# Patient Record
Sex: MALE | Race: WHITE | NOT HISPANIC OR LATINO
[De-identification: names, ages, dates, MRNs, and addresses within clinical notes are randomized per-mention and may not be internally consistent; named-entity substitution may affect disease eponyms.]

---

## 2018-06-24 ENCOUNTER — RECORDS - HEALTHEAST (OUTPATIENT)
Dept: ADMINISTRATIVE | Facility: OTHER | Age: 43
End: 2018-06-24

## 2019-07-10 ENCOUNTER — COMMUNICATION - HEALTHEAST (OUTPATIENT)
Dept: FAMILY MEDICINE | Facility: CLINIC | Age: 44
End: 2019-07-10

## 2019-07-11 ENCOUNTER — OFFICE VISIT - HEALTHEAST (OUTPATIENT)
Dept: FAMILY MEDICINE | Facility: CLINIC | Age: 44
End: 2019-07-11

## 2019-07-11 DIAGNOSIS — F41.1 GENERALIZED ANXIETY DISORDER: ICD-10-CM

## 2019-07-11 DIAGNOSIS — G47.00 INSOMNIA, UNSPECIFIED TYPE: ICD-10-CM

## 2019-07-11 ASSESSMENT — MIFFLIN-ST. JEOR: SCORE: 1648.76

## 2019-07-11 NOTE — ASSESSMENT & PLAN NOTE
Previously treated with citalopram which seemed to help but made him sleepy with driving.  Stopped this medicine a couple of years ago  Previously followed with Dr Wilkins    Recommend restarting SSRI, patient wishes to wait, speak with psychologist and consider  Agreeable to see psychologist again for this and insomnia

## 2019-07-11 NOTE — ASSESSMENT & PLAN NOTE
Largely related to generalized anxiety  May be partly due to regular alcohol use    Recommend restarting measures to help treat generalized anxiety disorder  Recommend stopping alcohol intake before bed  Prescribed zolpidem CR 6.25 for short-term use

## 2019-11-05 ENCOUNTER — COMMUNICATION - HEALTHEAST (OUTPATIENT)
Dept: FAMILY MEDICINE | Facility: CLINIC | Age: 44
End: 2019-11-05

## 2019-11-06 ENCOUNTER — AMBULATORY - HEALTHEAST (OUTPATIENT)
Dept: FAMILY MEDICINE | Facility: CLINIC | Age: 44
End: 2019-11-06

## 2019-11-06 DIAGNOSIS — F41.1 GENERALIZED ANXIETY DISORDER: ICD-10-CM

## 2019-11-22 ENCOUNTER — COMMUNICATION - HEALTHEAST (OUTPATIENT)
Dept: SCHEDULING | Facility: CLINIC | Age: 44
End: 2019-11-22

## 2020-08-04 ENCOUNTER — COMMUNICATION - HEALTHEAST (OUTPATIENT)
Dept: FAMILY MEDICINE | Facility: CLINIC | Age: 45
End: 2020-08-04

## 2020-08-04 DIAGNOSIS — F41.1 GENERALIZED ANXIETY DISORDER: ICD-10-CM

## 2021-05-18 ENCOUNTER — COMMUNICATION - HEALTHEAST (OUTPATIENT)
Dept: FAMILY MEDICINE | Facility: CLINIC | Age: 46
End: 2021-05-18

## 2021-05-18 DIAGNOSIS — F41.1 GENERALIZED ANXIETY DISORDER: ICD-10-CM
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
## 2021-05-30 NOTE — TELEPHONE ENCOUNTER
New Appointment Needed  What is the reason for the visit:    New Patient visit - previously saw Dr. Gtz - patient is having difficulties sleeping.  Provider Preference: PCP only  How soon do you need to be seen?: as soon as possible  Waitlist offered?: Yes  Okay to leave a detailed message:  Yes

## 2021-06-03 VITALS — BODY MASS INDEX: 21.67 KG/M2 | WEIGHT: 160 LBS | HEIGHT: 72 IN

## 2021-06-03 NOTE — TELEPHONE ENCOUNTER
Left low back pain for the last two weeks.    Mild soreness    Not interfering with activities    No blood in the urine or stool    No radiating pain anywhere    Recommended that he try some home treatments and if no improvement will schedule an appointment.    Mary Burrell RN   [facility] Medication Refill and Triage Nurse  9:20 AM  11/22/2019      Reason for Disposition    Back pain    Protocols used: BACK PAIN-A-[place]

## 2021-06-03 NOTE — TELEPHONE ENCOUNTER
Please contact him  As he may know, there are number of medications similar to citalopram.  I do not think we tried other ones, can he remind me?  If not, would he be willing to try another one like sertraline/ zoloft?  It is from the same class of medicine.

## 2021-06-03 NOTE — TELEPHONE ENCOUNTER
FYI - Status Update  Who is Calling: James- Pschyologist  Update: Met with patient  today- pt agreed to restart antidepressant - Citalopram, Pt will call MD to intiate medication.     Okay to leave a detailed message?:  No return call needed

## 2021-06-03 NOTE — TELEPHONE ENCOUNTER
Sounds good  Warn him that this med is a little more likely than others to cause diarrhea at first, usually gets better  If it goes well, see me in 1 mo

## 2021-06-03 NOTE — TELEPHONE ENCOUNTER
Patient Returning Call  Reason for call:  lmtcb    Information relayed to patient:  Relayed msg and patient agrees. Patient is willing to try Sertraline as suggested below by PCP . Pharmacy is the   [facility] # [id number]    Patient has additional questions:  No  If YES, what are your questions/concerns:  Na    Okay to leave a detailed message?: Yes

## 2021-06-03 NOTE — TELEPHONE ENCOUNTER
Called and spoke to pt regarding Dr. Gtz message. Pt states he picked up his prescription yesterday and has the instructions.

## 2021-06-09 ENCOUNTER — OFFICE VISIT - HEALTHEAST (OUTPATIENT)
Dept: FAMILY MEDICINE | Facility: CLINIC | Age: 46
End: 2021-06-09

## 2021-06-09 DIAGNOSIS — E78.00 HYPERCHOLESTEROLEMIA: ICD-10-CM

## 2021-06-09 DIAGNOSIS — Z80.8 FAMILY HISTORY OF MALIGNANT MELANOMA: ICD-10-CM

## 2021-06-09 DIAGNOSIS — Z00.00 HEALTHCARE MAINTENANCE: ICD-10-CM

## 2021-06-09 DIAGNOSIS — F41.1 GENERALIZED ANXIETY DISORDER: ICD-10-CM

## 2021-06-09 LAB
ALT SERPL W P-5'-P-CCNC: 35 U/L (ref 0–45)
CHOLEST SERPL-MCNC: 267 MG/DL
FASTING STATUS PATIENT QL REPORTED: YES
FASTING STATUS PATIENT QL REPORTED: YES
GLUCOSE BLD-MCNC: 84 MG/DL (ref 70–99)
HDLC SERPL-MCNC: 63 MG/DL
LDLC SERPL CALC-MCNC: 184 MG/DL
TRIGL SERPL-MCNC: 102 MG/DL

## 2021-06-09 ASSESSMENT — ANXIETY QUESTIONNAIRES
7. FEELING AFRAID AS IF SOMETHING AWFUL MIGHT HAPPEN: NOT AT ALL
4. TROUBLE RELAXING: NOT AT ALL
IF YOU CHECKED OFF ANY PROBLEMS ON THIS QUESTIONNAIRE, HOW DIFFICULT HAVE THESE PROBLEMS MADE IT FOR YOU TO DO YOUR WORK, TAKE CARE OF THINGS AT HOME, OR GET ALONG WITH OTHER PEOPLE: NOT DIFFICULT AT ALL
3. WORRYING TOO MUCH ABOUT DIFFERENT THINGS: NOT AT ALL
1. FEELING NERVOUS, ANXIOUS, OR ON EDGE: SEVERAL DAYS
2. NOT BEING ABLE TO STOP OR CONTROL WORRYING: NOT AT ALL
6. BECOMING EASILY ANNOYED OR IRRITABLE: SEVERAL DAYS
5. BEING SO RESTLESS THAT IT IS HARD TO SIT STILL: NOT AT ALL
GAD7 TOTAL SCORE: 2

## 2021-06-09 ASSESSMENT — PATIENT HEALTH QUESTIONNAIRE - PHQ9: SUM OF ALL RESPONSES TO PHQ QUESTIONS 1-9: 2

## 2021-06-09 ASSESSMENT — MIFFLIN-ST. JEOR: SCORE: 1681.64

## 2021-06-09 NOTE — ASSESSMENT & PLAN NOTE
Had Covid, due for hepatitis a and tetanus which were given today.  Blood pressure good, cholesterol rechecked.  No need for STD test.Exercising regularly.

## 2021-06-09 NOTE — ASSESSMENT & PLAN NOTE
Sertraline has been very effective for him and he reports no side effects.  Following with psychologist.  Will need to change providerwhen he moved to [place].

## 2021-06-10 ENCOUNTER — COMMUNICATION - HEALTHEAST (OUTPATIENT)
Dept: FAMILY MEDICINE | Facility: CLINIC | Age: 46
End: 2021-06-10

## 2021-06-16 PROBLEM — Z80.8 FAMILY HISTORY OF MALIGNANT MELANOMA: Status: ACTIVE | Noted: 2021-06-09

## 2021-06-16 PROBLEM — E78.00 HYPERCHOLESTEROLEMIA: Status: ACTIVE | Noted: 2021-06-09

## 2021-06-16 PROBLEM — G47.00 INSOMNIA, UNSPECIFIED TYPE: Status: ACTIVE | Noted: 2019-07-11

## 2021-06-17 NOTE — TELEPHONE ENCOUNTER
RN cannot approve Refill Request    RN can NOT refill this medication PCP messaged that patient is overdue for Office Visit. Last office visit: 7/11/2019 Fran Gtz MD Last Physical: Visit date not found Last MTM visit: Visit date not found Last visit same specialty: 7/11/2019 Fran Gtz MD.  Next visit within 3 mo: Visit date not found  Next physical within 3 mo: Visit date not found      Kay Melendrez, [facility] Triage/Med Refill 5/19/2021    Requested Prescriptions   Pending Prescriptions Disp Refills     sertraline (ZOLOFT) 50 MG tablet [Pharmacy Med Name: SERTRALINE 50MG TABLETS] 30 tablet 6     Sig: TAKE ONE-HALF TABLET BY MOUTH DAILY FOR 7 DAYS, THEN TAKE 1 TABLET BY MOUTH DAILY       SSRI Refill Protocol  Failed - 5/18/2021  3:00 PM        Failed - PCP or prescribing provider visit in last year     Last office visit with prescriber/PCP: 7/11/2019 Fran Gtz MD OR same dept: Visit date not found OR same specialty: 7/11/2019 Fran Gtz MD  Last physical: Visit date not found Last MTM visit: Visit date not found   Next visit within 3 mo: Visit date not found  Next physical within 3 mo: Visit date not found  Prescriber OR PCP: Fran Gtz MD  Last diagnosis associated with med order: 1. Generalized anxiety disorder  - sertraline (ZOLOFT) 50 MG tablet [Pharmacy Med Name: SERTRALINE 50MG TABLETS]; TAKE ONE-HALF TABLET BY MOUTH DAILY FOR 7 DAYS, THEN TAKE 1 TABLET BY MOUTH DAILY  Dispense: 30 tablet; Refill: 6    If protocol passes may refill for 12 months if within 3 months of last provider visit (or a total of 15 months).

## 2021-06-17 NOTE — TELEPHONE ENCOUNTER
Left message #1 at [phone]. Postponing task out to a week and will try again. If patient returns call back, please help patient schedule an appointment per message below. Thanks!

## 2021-06-26 NOTE — PROGRESS NOTES
Assessment/ Plan  Problem List Items Addressed This Visit        Unprioritized    Generalized anxiety disorder     Sertraline has been very effective for him and he reports no side effects.  Following with psychologist.  Will need to change provider when he moved to [place].         Healthcare maintenance     Had Covid, due for hepatitis a and tetanus which were given today.  Blood pressure good, cholesterol rechecked.  No need for STD test.  Exercising regularly.         Relevant Orders    Glucose    Family history of malignant melanoma - Primary     Father.  Annual body scan.  Will have done with Dr. Graham before he moves.         Relevant Orders    Ambulatory referral to Dermatology - [facility] [place]    Hypercholesterolemia     Elevated screening 2015.  Check fasting lipid today.         Relevant Orders    Lipid [place] FASTING    ALT (SGPT)        Subjective  CC:  Chief Complaint   Patient presents with     Annual Exam     HPI:  46-year-old,  Here for physical exam.  Long-term history of anxiety, on sertraline.  Currently doing very well by his account.  Sertraline gives him no side effects and really seems to help control his anxiety.  Does not think he needs to go up on the dose on this medication.  Following with psychologist.  Wife is moved to [facility], he is going to follow her.  She has a job.  He is going to be quitting his job for now.  PFSH:  Patient Active Problem List   Diagnosis     Generalized anxiety disorder     Healthcare maintenance     Insomnia, unspecified type     Family history of malignant melanoma     Hypercholesterolemia     Current medications reviewed as follows:  Current Outpatient Medications on File Prior to Visit   Medication Sig     sertraline (ZOLOFT) 50 MG tablet TAKE ONE-HALF TABLET BY MOUTH DAILY FOR 7 DAYS, THEN TAKE 1 TABLET BY MOUTH DAILY     [DISCONTINUED] ketoconazole (NIZORAL) 2 % cream Apply to face bid     [DISCONTINUED] zolpidem (AMBIEN CR)  "6.25 MG CR tablet Take 1 tablet (6.25 mg total) by mouth at bedtime as needed for sleep.     No current facility-administered medications on file prior to visit.      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  Gen- alert, oriented/ appropriately responsive  HEENT- normal cephalic, atraumatic.   Chest- Normal inspiration and expiration.    Clear to ascultation.    No chest wall deformity or scar.  CV- Heart regular rate and rhythm  normal tones, no murmurs   No gallops or rubs.  Ext- appear well perfused, no edema  Skin- warm and dry,   no visualized rash    Diagnostics:   Pending      Please note: Voice recognition software was used in this dictation.  It may therefore contain typographical errors.      "

## 2021-07-04 NOTE — LETTER
"Letter by Fran Gtz MD at      Author: Fran Gtz MD Service: -- Author Type: --    Filed:  Encounter Date: 6/10/2021 Status: (Other)         Elieser Polanco  [address]  [place] [address]             [place] 10, 2021         Dear Mr. Polanco,    Below are the results from your recent visit:    Resulted Orders   Lipid [place] FASTING   Result Value Ref Range    Cholesterol 267 (H) <=199 mg/dL    Triglycerides 102 <=149 mg/dL    HDL Cholesterol 63 >=40 mg/dL    LDL Calculated 184 (H) <=129 mg/dL    Patient Fasting > 8hrs? Yes    ALT (SGPT)   Result Value Ref Range    ALT 35 0 - 45 U/L   Glucose   Result Value Ref Range    Glucose 84 70 - 99 mg/dL    Patient Fasting > 8hrs? Yes     Narrative    Fasting Glucose reference range is 70-99 mg/dL per  American Diabetes Association (ADA) guidelines.       Elieser, your cholesterol is pretty high.    As you may know, LDL is the \"bad cholesterol\", elevated numbers puts you at high risk for atherosclerosis-heart attack and stroke  HDL is the protective cholesterol.  Both of your numbers are high-the HDL tempers your risk.    To determine whether you deserve meant with a \"statin\" medication, we put you on a 10-year risk scale of having a heart attack based on age, blood pressure and gender.  You have not intermediate risk of 2.1%.  We generally recommend statin medications if your risk is above 7.5%  That said, guidelines say if your LDL is above 190 at any risk, and you cannot bring it down by diet, you should consider a statin medication.      Bottom line, I would do which you can with diet and exercise  As you know, LDL cholesterol comes down with reducing animal fat in diet.  Especially from processed meats and red meat.  My belief is that it is the overall risk that matters and that probably has more to do with weight, exercise and simple carbohydrates.    Either way, lots of biking and lots of vegetables.    I might check this in 3 to 6 months again " with your new doctor in [place].    I am happy to answer any questions.  Best wishes Elieser!        Please call with questions or contact us using [employer].    Sincerely,        Electronically signed by Fran Gtz MD

## 2021-07-06 VITALS
RESPIRATION RATE: 17 BRPM | HEIGHT: 72 IN | SYSTOLIC BLOOD PRESSURE: 115 MMHG | DIASTOLIC BLOOD PRESSURE: 80 MMHG | WEIGHT: 169 LBS | BODY MASS INDEX: 22.89 KG/M2 | HEART RATE: 76 BPM

## 2021-07-06 ASSESSMENT — PATIENT HEALTH QUESTIONNAIRE - PHQ9: SUM OF ALL RESPONSES TO PHQ QUESTIONS 1-9: 2

## 2021-07-08 ASSESSMENT — ANXIETY QUESTIONNAIRES: GAD7 TOTAL SCORE: 2
